# Patient Record
Sex: MALE | Race: WHITE
[De-identification: names, ages, dates, MRNs, and addresses within clinical notes are randomized per-mention and may not be internally consistent; named-entity substitution may affect disease eponyms.]

---

## 2021-02-13 ENCOUNTER — HOSPITAL ENCOUNTER (EMERGENCY)
Dept: HOSPITAL 41 - JD.ED | Age: 56
Discharge: HOME | End: 2021-02-13
Payer: SELF-PAY

## 2021-02-13 DIAGNOSIS — K40.90: Primary | ICD-10-CM

## 2021-02-13 PROCEDURE — 96375 TX/PRO/DX INJ NEW DRUG ADDON: CPT

## 2021-02-13 PROCEDURE — 99283 EMERGENCY DEPT VISIT LOW MDM: CPT

## 2021-02-13 PROCEDURE — 96374 THER/PROPH/DIAG INJ IV PUSH: CPT

## 2021-02-13 PROCEDURE — 74176 CT ABD & PELVIS W/O CONTRAST: CPT

## 2021-02-13 NOTE — EDM.PDOC
ED HPI GENERAL MEDICAL PROBLEM





- General


Chief Complaint: Genitourinary Problem


Stated Complaint: GROIN PAIN


Time Seen by Provider: 02/13/21 14:29


Source of Information: Reports: Patient


History Limitations: Reports: No Limitations





- History of Present Illness


INITIAL COMMENTS - FREE TEXT/NARRATIVE: 





55-year-old male presents to the ED with left inguinal groin pain from a large 

inguinal hernia.  Patient states has been bothering for about a week but today 

the pain is constant and getting worse.  Associated mild nausea without 

vomiting.  Normal bowel function and normal ability to void.  No previous 

inguinal hernia surgery.  Does feel some pain rating down towards the left 

testicle.  It hurts worse to stand to walk and to cough.


Onset: Gradual


Onset Date: 02/06/21 (Left inguinal discomfort started about a week ago and has 

gradually intensified)


Duration: Day(s):, Getting Worse


Location: Reports: Abdomen (Left inguinal portion of abdominal)


Quality: Reports: Ache, Throbbing


Severity: Moderate (6 out of 10)


Improves with: Reports: Rest


Worsens with: Reports: Other (Moving hurts worse to move stand cough sneeze or 

laugh.)


Context: Denies: Activity, Exercise, Lifting, Sick Contact, Trauma, Other


Associated Symptoms: Reports: Nausea/Vomiting (Of nausea due to the intensity of

the pain.).  Denies: No Other Symptoms, Confusion, Chest Pain, Cough, cough w 

sputum, Diaphoresis, Fever/Chills, Headaches, Loss of Appetite, Malaise, 

Shortness of Breath, Syncope


Treatments PTA: Reports: Other (see below) (None.)


  ** Groin


Pain Score (Numeric/FACES): 8





- Related Data


                                    Allergies











Allergy/AdvReac Type Severity Reaction Status Date / Time


 


No Known Allergies Allergy   Verified 02/13/21 14:22











Home Meds: 


                                    Home Meds





. [No Known Home Meds]  02/13/21 [History]











Past Medical History





- Past Surgical History


Neurological Surgical History: Reports: Lumbar Spine (Multiple surgeries lumbar 

spine with fusion procedure.)





Social & Family History





- Tobacco Use


Tobacco Use Status *Q: Never Tobacco User





- Living Situation & Occupation


Living situation: Reports: 


Occupation: Unemployed





ED ROS GENERAL





- Review of Systems


Review Of Systems: See Below


Constitutional: Reports: Decreased Appetite.  Denies: Fever, Chills, Malaise, 

Weakness, Fatigue, Weight Loss


HEENT: Reports: Glasses, Hearing Loss


Respiratory: Reports: No Symptoms


Cardiovascular: Reports: No Symptoms


Endocrine: Reports: No Symptoms


GI/Abdominal: Reports: Abdominal Pain (Left inguinal groin pain.  Started about 

a week ago and has gradually increased in intensity), Decreased Appetite ( 

particularly over the last 12 to 14 hours.), Nausea.  Denies: Constipation, 

Diarrhea, Stool Incontinence, Vomiting


: Reports: No Symptoms


Musculoskeletal: Reports: Neck Pain, Shoulder Pain (Chronic low back pain.), 

Back Pain, Joint Pain


Skin: Reports: No Symptoms (Knees and hips at times.)


Neurological: Reports: No Symptoms


Psychiatric: Reports: No Symptoms


Hematologic/Lymphatic: Reports: No Symptoms


Immunologic: Reports: No Symptoms





ED EXAM, GI/ABD





- Physical Exam


Exam: See Below


Exam Limited By: No Limitations


General Appearance: Alert, WD/WN, Mild Distress, Other (Temperature is 36.7.  

Heart rate is 96 and sinus respiratory is 20 with O2 sats of 99% room air BP 

119/79)


Eyes: Bilateral: Normal Appearance (Blepharal pallor or scleral icterus.)


Throat/Mouth: Normal Inspection, Normal Lips, Normal Oropharynx


Head: Atraumatic, Normocephalic


Neck: Normal Inspection, Supple, Non-Tender, Full Range of Motion.  No: 

Lymphadenopathy (L), Lymphadenopathy (R)


Respiratory/Chest: Lungs Clear, Normal Breath Sounds, No Accessory Muscle Use, 

Chest Non-Tender, Respiratory Distress (Mild tachypnea at rest.)


Cardiovascular: Normal Peripheral Pulses, Regular Rate, Rhythm, No Edema, No 

Gallop, No Murmur, No Rub


GI/Abdominal Exam: Normal Bowel Sounds, Soft, No Organomegaly, No Mass, Pelvis 

Stable, Other (Patient has a obvious left inguinal hernia which appears to be 

incarcerated.  It is exquisitely tender to touch.  No erythema.  It does enter 

the upper portion of the scrotum on the left side.)


 (Male) Exam: Hernia (Large left inguinal hernia which appears to be 

incarcerated.), Scrotum Tenderness (L) (My).  No: Inguinal Lymphadenopathy, 

Scrotum Tenderness (R) ( mild superior aspect of the left scrotum.)


Back Exam: Decreased Range of Motion, Other (Multiple surgical scars lumbar 

spine).  No: CVA Tenderness (L), CVA Tenderness (R)


Extremities: Normal Inspection, Normal Range of Motion, Non-Tender, No Pedal 

Edema


Neurological: Alert, Oriented, CN II-XII Intact, Normal Cognition


Psychiatric: Normal Affect, Normal Mood


Skin Exam: Warm, Dry, Intact, Normal Color, No Rash





Course





- Vital Signs


Last Recorded V/S: 


                                Last Vital Signs











Temp  36.7 C   02/13/21 14:17


 


Pulse  78   02/13/21 16:00


 


Resp  16   02/13/21 16:00


 


BP  119/79   02/13/21 14:17


 


Pulse Ox  99   02/13/21 16:00














- Orders/Labs/Meds


Orders: 


                               Active Orders 24 hr











 Category Date Time Status


 


 Abdomen Pelvis wo Cont [CT] Stat Exams  02/13/21 14:30 Taken











Meds: 


Medications














Discontinued Medications














Generic Name Dose Route Start Last Admin





  Trade Name Faye  PRN Reason Stop Dose Admin


 


Hydromorphone HCl  1 mg  02/13/21 14:29  02/13/21 14:45





  Dilaudid  IVPUSH  02/13/21 14:30  1 mg





  ONETIME ONE   Administration


 


Dextrose/Sodium Chloride  1,000 mls @ 125 mls/hr  02/13/21 14:30  02/13/21 14:46





  Dextrose 5%-Normal Saline  IV   125 mls/hr





  ASDIRECTED FABIANA   Administration


 


Metoclopramide HCl  7.5 mg  02/13/21 14:29  02/13/21 14:45





  Reglan  IVPUSH  02/13/21 14:30  7.5 mg





  ONETIME ONE   Administration














- Radiology Interpretation


Free Text/Narrative:: 


55-year-old male presents to the ED with left inguinal bulge swelling for the 

last week but constant over the last 12 to 14 hours.  Painful to walk, sit or 

cough.  Exam reveals a very large left-sided inguinal hernia which appears to be

 incarcerated.  The area is very tender to touch without any overlying erythema.

  I cannot hear any bowel sounds within the hernia.  Plan he will be given 

Dilaudid 1 mg IV with Reglan 7.5 mg IV for pain and nausea relief.  CT scan of 

the abdomen pelvis will be performed without contrast at this time.  I will then

 attempt to try to reduce the hernia which would reduce the need for immediate 

surgery.








- Re-Assessments/Exams


Free Text/Narrative Re-Assessment/Exam: 





02/13/21 15:40 CT scan of the abdomen pelvis has been completed without any 

contrast.  Patient had an obvious large left inguinal hernia on examination.  CT

 was done after he had received 1 mg of Dilaudid IV with Reglan 7.5 mg IV.  CT d

emonstrates a fat containing left inguinal hernia.  There is no incarcerated 

small or large bowel at this time.  Patient shows a very mild left-sided 

inguinal hernia containing fat as well.  There are 2 calcifications adjacent to 

the proximal penis on the right side for which I have no explanation.  Prostate 

gland appears normal.  He has a moderate hiatal hernia.  Liver appears 

homogeneous without any intraductal calcification.  Gallbladder is appreciated 

without any calcified gallstones.  Pancreas is normal spleen is normal.  Adrenal

 glands show no nodules.  There is a large exophytic cyst off the superior pole 

of the left kidney.  No calcifications are appreciated either kidney.  Ureters 

are patent without any obstructions.  There is a large amount of stool 

throughout the colon.  No bowel obstruction evident.





02/13/21 15:46On re examination the inguanl hernia has reduced itself after pain

 medication. I Will advise him to follow up with  general surgeon  Dr Pradip Santizo have the hernia definitively repaired.








Departure





- Departure


Time of Disposition: 15:49


Disposition: Home, Self-Care 01


Condition: Fair


Clinical Impression: 


 Reducible left inguinal hernia








- Discharge Information


*PRESCRIPTION DRUG MONITORING PROGRAM REVIEWED*: Not Applicable


*COPY OF PRESCRIPTION DRUG MONITORING REPORT IN PATIENT DRE: Not Applicable


Instructions:  Inguinal Hernia, Adult, Easy-to-Read


Referrals: 


PCP,None [Primary Care Provider] - 


Forms:  ED Department Discharge


Additional Instructions: 


Evaluation in the emergency room today in regards to persistent painful swelling

left inguinal area today.  You have appreciated gradually worsening pain in the 

left inguinal area for the last couple of weeks.  On examination in the ED you 

have a large left inguinal hernia that was tender to touch and too painful to 

attempt reduction without pain medication.  You therefore were given 1 mg of 

Dilaudid IV with Reglan 7.5 mg IV to prevent any nausea or vomiting.  CT scan of

the abdomen and pelvis was performed which reveals a fat-containing left-sided 

inguinal hernia at this time with no bowel within it.  There is also small 

right-sided inguinal hernia as well.  When I went back to examine you after the 

CT scan was performed you appreciated that the pain was much improved.  Examina

tion reveals the left inguinal hernia has reduced itself and I did not have to 

help you reduce it.  Suggest going to one of the local drugstore such as [employer] and purchasing a hernia belt which will put direct pressure over the left 

inguinal area up to prevent it from coming out again until you can have surgical

management done.  Suggest follow-up with Dr. Pradip Santizo here in our [facility]

[facility] at the surgical clinic.  Please phone [phone] to arrange an 

appointment.  Explained to him that CT scan of the area was completed during her

ED visit today.





Sepsis Event Note (ED)





- Evaluation


Sepsis Screening Result: No Definite Risk





- Focused Exam


Vital Signs: 


                                   Vital Signs











  Temp Pulse Resp BP Pulse Ox


 


 02/13/21 16:00   78  16   99


 


 02/13/21 14:17  36.7 C  96  20  119/79  99














- My Orders


Last 24 Hours: 


My Active Orders





02/13/21 14:30


Abdomen Pelvis wo Cont [CT] Stat 














- Assessment/Plan


Last 24 Hours: 


My Active Orders





02/13/21 14:30


Abdomen Pelvis wo Cont [CT] Stat

## 2021-02-13 NOTE — CT
CT abdomen and pelvis

 

Technique: Multiple axial sections were obtained from below the top of

 the liver inferiorly to below the pubic symphysis.  Intravenous and 

oral contrast was not utilized.  Reconstructed coronal and sagittal 

images were obtained.

 

Comparison: No prior CT abdomen or pelvis studies are available.

 

Findings: Visualized lung bases show nothing acute.

 

Noncontrast appearance of the liver shows no discrete abnormality.  

Right hemidiaphragm is elevated which is most likely chronic.  Spleen 

size is normal.  Adrenal glands show no nodule.  Gallbladder contains 

no calcified gallstones.  Pancreas shows no discrete abnormality.

 

Kidneys show evidence of cysts.  Cyst on the right side measures up to

 4.7 cm.  Several calcifications are seen which are believed to be 

incidental within this first cyst.  Largest cyst within the right 

kidney measures 3.0 cm.

 

Abdominal aorta shows no aneurysm.  No retroperitoneal adenopathy or 

mesenteric abnormalities are seen.  No pelvic mass or adenopathy is 

noted.  Very small left-sided inguinal hernia is noted.  No bowel is 

seen to project into the hernia.  No pelvic mass or adenopathy is 

seen.  No free fluid or inflammatory change is seen.  Scattered 

diverticuli are seen within the descending colon and sigmoid colon.

 

Bone window settings were reviewed which show disc space narrowing at 

L5-S1.  No acute osseous finding is appreciated.

 

Impression:

1.  Findings as noted above.

2.  Minimal left-sided inguinal hernia which shows no evidence of 

bowel protrusion.

3.  Other findings as noted above which are believed to be incidental.

 

Diagnostic code #2

## 2021-02-23 ENCOUNTER — HOSPITAL ENCOUNTER (OUTPATIENT)
Dept: HOSPITAL 41 - JD.SDS | Age: 56
Discharge: HOME | End: 2021-02-23
Attending: SURGERY
Payer: SELF-PAY

## 2021-02-23 DIAGNOSIS — F17.210: ICD-10-CM

## 2021-02-23 DIAGNOSIS — K40.90: Primary | ICD-10-CM

## 2021-02-23 DIAGNOSIS — N39.0: ICD-10-CM

## 2021-02-23 PROCEDURE — C1781 MESH (IMPLANTABLE): HCPCS

## 2021-02-23 PROCEDURE — 49505 PRP I/HERN INIT REDUC >5 YR: CPT

## 2021-02-23 NOTE — PCM.PRNOTE
- Free Text/Narrative


Note: 





Date: 2/23/2021


Operation: open left inguinal hernia repair


Surgeon: Pradip Santizo MD





Findings: small left reduced inguinal hernia. Mesh repair completed using 

Progrip. 





Detailed Report:





The patient was taken to the operating room and placed in supine position.  

Timeout was performed and monitored anesthesia care was initiated.  Left groin 

hair was clipped, and the inguinal region was prepped and draped in usual 

sterile fashion.  10 cc of 0.5% Marcaine with epinephrine was injected 

intradermally along the projection of the inguinal ligament from the ASIS to the

left pubic tubercle.  Additional 10 cc of local anesthetic was injected in the 

subcutaneous tissue.  A 7 cm incision overlying the projection of the inguinal 

ligament was made with the scalpel.  Dissection was carried down to the external

oblique aponeurosis.  An additional 10 cc of local anesthetic was injected just 

deep to the aponeurosis and the inguinal canal.  A stab incision was made just 

proximal to the external ring, and Metzenbaum scissors were passed just deep to 

the aponeurosis to separate the spermatic cord from the overlying aponeurotic 

tissue.  Scissors were used to open up the roof of the inguinal canal.  The 

ilioinguinal nerve was readily apparent.  This was protected, and blunt 

dissection was used to free up the spermatic cord from cremasteric fibers.  A 

right angle clamp was passed deep to the cord structures at the level of the 

pubic tubercle.  Dissection proceeded proximally, freeing up the cord.  The 

inguinal hernia sac was dissected free from the cord, and lay in expected 

anteromedial position.  Once the sac was freed back to the level of the internal

ring, scissors were used to open the sac.  Digital exam confirmed hernia sac.  A

clamp was placed on the midportion of the sac after it was well  from 

cord contents.  Suture ligature was used at the proximal portion of the sac, and

the distal portion was amputated with scissors.  Next, mesh was introduced into 

the field.  The mesh was cut to size, and the inferomedial portion of the mesh 

was sutured to Jose Elias's ligament, with good 2 cm medial and inferior overlap.  

The lateral edge of the mesh was sutured to the shelving edge of the inguinal 

ligament.  The medial aspect of the mesh was tacked to the internal oblique 

muscle fibers.  The mesh lay in good position, and the external ring was 

recreated with suture.  The external oblique fibers were reapproximated with 

running Vicryl suture.  Gurpreet's fascia was closed over top with interrupted 

Vicryl suture.  Skin was closed with running subcuticular Vicryl suture and the 

incision was dressed with Dermabond.  An additional 10 cc was injected for 

regional block just medial to the ASIS.  Patient tolerated the procedure well, 

was transferred to the recovery room in good condition with anticipated plan for

discharge to home.

## 2021-02-23 NOTE — PCM.PREANE
Preanesthetic Assessment





- Procedure


Proposed Procedure: 


Left inguinal hernia repair with mesh








- Anesthesia/Transfusion/Family Hx


Anesthesia History: Prior Anesthesia Without Reaction





- Review of Systems


General: No Symptoms


Pulmonary: No Symptoms


Cardiovascular: No Symptoms


Gastrointestinal: No Symptoms


Neurological: No Symptoms


Other: Reports: None





- Physical Assessment


NPO Status Date: 02/22/21


NPO Status Time: 23:00


Vital Signs: 


127/83


83


93%





ASA Class: 2


Mental Status: Alert & Oriented x3


Airway Class: Mallampati = 1


Dentition: Reports: Broken Tooth/Teeth, Missing Tooth/Teeth, Caries (extremely 

poor dentition, multiple decayed and broken teeth)


Thyro-Mental Finger Breadths: 3


Mouth Opening Finger Breadths: 3


ROM/Head Extension: Full


Lungs: Clear to Auscultation, Normal Respiratory Effort


Cardiovascular: Regular Rate, Regular Rhythm





- Allergies


Allergies/Adverse Reactions: 


                                    Allergies











Allergy/AdvReac Type Severity Reaction Status Date / Time


 


No Known Allergies Allergy   Verified 02/22/21 10:48














- Acknowledgements


Anesthesia Type Planned: General Anesthesia, Regional Block (possible 

ilioinguinal-iliohypogastric block postoperatively), MAC


Pt an Appropriate Candidate for the Planned Anesthesia: Yes


Alternatives and Risks of Anesthesia Discussed w Pt/Guardian: Yes


Pt/Guardian Understands and Agrees with Anesthesia Plan: Yes





PreAnesthesia Questionnaire


HEENT History: Reports: Impaired Vision


Other HEENT History: Pt wears glasses


Cardiovascular History: Reports: None


Respiratory History: Reports: None


Gastrointestinal History: Reports: None


Genitourinary History: Reports: Other (See Below)


Other Genitourinary History: dysuria, UTI


OB/GYN History: Reports: None


Musculoskeletal History: Reports: Back Pain, Chronic


Neurological History: Reports: None


Psychiatric History: Reports: None


Endocrine/Metabolic History: Reports: None


Hematologic History: Reports: None


Immunologic History: Reports: None


Oncologic (Cancer) History: Reports: None


Dermatologic History: Reports: None





- Infectious Disease History


Infectious Disease History: Reports: None





- Past Surgical History


Head Surgeries/Procedures: Reports: None


HEENT Surgical History: Reports: Eye Surgery


Cardiovascular Surgical History: Reports: None


Respiratory Surgical History: Reports: None


GI Surgical History: Reports: Colonoscopy


Female  Surgical History: Reports: None


Male  Surgical History: Reports: Vasectomy


Endocrine Surgical History: Reports: None


Neurological Surgical History: Reports: Lumbar Spine


Musculoskeletal Surgical History: Reports: Other (See Below)


Other Musculoskeletal Surgeries/Procedures:: foot surgery


Oncologic Surgical History: Reports: None


Dermatological Surgical History: Reports: None





- SUBSTANCE USE


Tobacco Use Status *Q: Current Every Day Tobacco User


Recreational Drug Use History: Yes


Recreational Drug Type: Reports: Marijuana/Hashish


Recreational Drug Last Use: 02/15/21





- HOME MEDS


Home Medications: 


                                    Home Meds





. [No Known Home Meds]  02/13/21 [History]











- CURRENT (IN HOUSE) MEDS


Current Meds: 





                               Current Medications





Lactated Ringer's (Ringers, Lactated)  1,000 mls @ 125 mls/hr IV ASDIRECTED FABIANA


   Stop: 02/23/21 23:00


Lidocaine/Sodium Bicarbonate (Buffered Lidocaine 1% In Ns 8.4%)  0.25 ml IDERM 

ONETIME PRN


   PRN Reason: Prior to IV Start


   Stop: 02/23/21 18:00


Sodium Chloride (Saline Flush)  10 ml FLUSH ASDIRECTED PRN


   PRN Reason: Keep Vein Open


   Stop: 02/23/21 18:00

## 2021-02-23 NOTE — [id number]
Post Anesthesia Note





- EVALUATION WITHIN 48HRS OF ANESTHETIC


Vital Signs in Normal Range: Yes


Patient Participated in Evaluation: Yes


Respiratory Function Stable: Yes


Airway Patent: Yes


Cardiovascular Function Stable: Yes


Hydration Status Stable: Yes


Pain Control Satisfactory: Yes


Nausea and Vomiting Control Satisfactory: Yes


Mental Status Recovered: Yes


Vital Signs: 


                                Last Vital Signs











Temp  98.4 F   02/23/21 09:12


 


Pulse  79   02/23/21 09:45


 


Resp  18   02/23/21 09:45


 


BP  110/74   02/23/21 09:45


 


Pulse Ox  95   02/23/21 09:45

## 2021-09-21 ENCOUNTER — HOSPITAL ENCOUNTER (EMERGENCY)
Dept: HOSPITAL 41 - JD.ED | Age: 56
Discharge: HOME | End: 2021-09-21
Payer: COMMERCIAL

## 2021-09-21 DIAGNOSIS — Z72.0: ICD-10-CM

## 2021-09-21 DIAGNOSIS — S32.040A: Primary | ICD-10-CM
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
## 2021-09-21 PROCEDURE — 72100 X-RAY EXAM L-S SPINE 2/3 VWS: CPT

## 2021-09-21 PROCEDURE — 99283 EMERGENCY DEPT VISIT LOW MDM: CPT

## 2021-09-21 PROCEDURE — 96372 THER/PROPH/DIAG INJ SC/IM: CPT

## 2021-09-21 PROCEDURE — 73502 X-RAY EXAM HIP UNI 2-3 VIEWS: CPT
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
## 2021-09-22 NOTE — CR
Lumbar spine: AP, lateral and coned-down lateral view centered to the 

lumbosacral junction were obtained.

 

Comparison: Prior reconstructed sagittal images obtained during CT 

abdomen and pelvis study of 02/13/21.

 

Moderate anterior wedge deformity is seen within L4 which is felt to 

be acute.  No retrolisthesis fragments are seen.

 

Other vertebral body heights are maintained.  Moderate disc space 

narrowing is seen at L5-S1.  Anterior osteophytes are seen at L5-S1.  

Pedicles are intact.  Visualized transverse and spinous processes are 

intact.  Slight degenerative change is noted within the sacroiliac 

joints.

 

Impression:

1.  Moderate anterior wedge deformity of L4 which is felt to be acute.

  No acute retrolisthesis fragments are seen.

2.  Degenerative change within L5-S1.

 

Diagnostic code #3

 

I mildly disagree with preliminary report from [facility] (fracture within 

L4 is felt to be acute), finalized on 09/21/21, 10:33 PM CDT, code 2